# Patient Record
Sex: MALE | Race: WHITE | Employment: FULL TIME | URBAN - METROPOLITAN AREA
[De-identification: names, ages, dates, MRNs, and addresses within clinical notes are randomized per-mention and may not be internally consistent; named-entity substitution may affect disease eponyms.]

---

## 2018-01-16 ENCOUNTER — OFFICE VISIT (OUTPATIENT)
Dept: FAMILY MEDICINE CLINIC | Facility: CLINIC | Age: 33
End: 2018-01-16

## 2018-01-16 VITALS
DIASTOLIC BLOOD PRESSURE: 78 MMHG | HEIGHT: 71 IN | BODY MASS INDEX: 22.54 KG/M2 | TEMPERATURE: 97 F | RESPIRATION RATE: 16 BRPM | SYSTOLIC BLOOD PRESSURE: 128 MMHG | HEART RATE: 76 BPM | WEIGHT: 161 LBS

## 2018-01-16 DIAGNOSIS — A63.0 ANOGENITAL WART: Primary | ICD-10-CM

## 2018-01-16 PROCEDURE — 17110 DESTRUCTION B9 LES UP TO 14: CPT | Performed by: FAMILY MEDICINE

## 2018-01-16 RX ORDER — TRETINOIN 0.025 %
1 CREAM (GRAM) TOPICAL AS NEEDED
COMMUNITY
End: 2020-07-17 | Stop reason: ALTCHOICE

## 2018-01-16 RX ORDER — CLINDAMYCIN PHOSPHATE 10 MG/G
1 GEL TOPICAL DAILY
COMMUNITY
End: 2018-11-30

## 2018-01-16 NOTE — PROGRESS NOTES
[facility] [place]  PROGRESS NOTE  Chief Complaint:   Patient presents with:  Derm Problem      HPI:   This is a 29year old male coming in for treatment of penile warts. He has had these warts for many years.   He has had them treated several d Denies headache, seizures, dizziness, syncope, paralysis, ataxia, numbness or tingling in the extremities,change in bowel or bladder control. HEMATOLOGIC:  Denies anemia, bleeding or bruising. LYMPHATICS:  Denies enlarged nodes or history of splenectomy. is notified to call with any questions, complications, allergies, or worsening or changing symptoms. Patient is to call with any side effects or complications from the treatments as a result of today.      Problem List:  Patient Active Problem List:     An

## 2018-11-30 ENCOUNTER — OFFICE VISIT (OUTPATIENT)
Dept: FAMILY MEDICINE CLINIC | Facility: CLINIC | Age: 33
End: 2018-11-30
Payer: COMMERCIAL

## 2018-11-30 VITALS
WEIGHT: 163 LBS | HEIGHT: 71 IN | RESPIRATION RATE: 16 BRPM | DIASTOLIC BLOOD PRESSURE: 82 MMHG | HEART RATE: 60 BPM | BODY MASS INDEX: 22.82 KG/M2 | SYSTOLIC BLOOD PRESSURE: 114 MMHG | TEMPERATURE: 97 F

## 2018-11-30 DIAGNOSIS — B02.9 HERPES ZOSTER WITHOUT COMPLICATION: ICD-10-CM

## 2018-11-30 DIAGNOSIS — A63.0 ANOGENITAL WART: ICD-10-CM

## 2018-11-30 DIAGNOSIS — Z00.00 HEALTH CARE MAINTENANCE: Primary | ICD-10-CM

## 2018-11-30 PROCEDURE — 99395 PREV VISIT EST AGE 18-39: CPT | Performed by: FAMILY MEDICINE

## 2018-11-30 RX ORDER — ELECTROLYTES/DEXTROSE
1 SOLUTION, ORAL ORAL DAILY
COMMUNITY

## 2018-11-30 NOTE — PROGRESS NOTES
[facility] [place]  PROGRESS NOTE  Chief Complaint:   Patient presents with:  Physical      HPI:   This is a 29year old male coming in for his annual wellness visit. For over a year and a half now he has been vegan.   He is very comfortable w fatigue. EENT:  Eyes:  Denies eye pain, visual loss, blurred vision, double vision or yellow sclerae. Ears, Nose, Throat:  Denies hearing loss, sneezing, congestion, runny nose or sore throat.   INTEGUMENTARY:  Denies rashes, itching, skin lesion, or exces dentition. NECK: Supple, no CLAD, no JVD, no thyromegaly. SKIN: No rashes, no skin lesion, no bruising, good turgor. HEART: Slow steady strong and regular beat. Regular S1-S2.  LUNGS: Clear to auscultation bilterally, no rales/rhonchi/wheezing.   ABDOME PANEL  - ASSAY, THYROID STIM HORMONE  - B12 AND FOLATE  - IRON AND TIBC      Meds & Refills for this Visit:  Requested Prescriptions      No prescriptions requested or ordered in this encounter     Patient/Caregiver Education: Patient/Caregiver Education:

## 2018-12-03 ENCOUNTER — TELEPHONE (OUTPATIENT)
Dept: FAMILY MEDICINE CLINIC | Facility: CLINIC | Age: 33
End: 2018-12-03

## 2018-12-03 NOTE — TELEPHONE ENCOUNTER
----- Message from Kristian Kearney MD sent at 12/3/2018  2:08 PM CST -----  Please call Tamera Rodriguez. The news from his labs is very good. His hemoglobin is normal at 14.9. He is not anemic. His blood sugar is normal at 82. He does not have diabetes.   Hi

## 2019-01-07 ENCOUNTER — TELEPHONE (OUTPATIENT)
Dept: FAMILY MEDICINE CLINIC | Facility: CLINIC | Age: 34
End: 2019-01-07

## 2019-01-07 NOTE — TELEPHONE ENCOUNTER
Patient requesting Rx for genital warts that was discussed with Dr Meaghan Campbell to be sent to  [facility]. States the cryo has not kept the warts away. Please advise.   Brisa Garnica, 01/07/19, 1:46 PM

## 2019-01-09 RX ORDER — IMIQUIMOD 12.5 MG/.25G
CREAM TOPICAL
Qty: 24 PACKET | Refills: 1 | Status: SHIPPED | OUTPATIENT
Start: 2019-01-09 | End: 2020-03-09 | Stop reason: ALTCHOICE

## 2019-01-09 NOTE — TELEPHONE ENCOUNTER
I will send in a prescription for Aldara for him. He should apply this to the warts at bedtime three times a week for 8 weeks.

## 2019-01-29 ENCOUNTER — OFFICE VISIT (OUTPATIENT)
Dept: FAMILY MEDICINE CLINIC | Facility: CLINIC | Age: 34
End: 2019-01-29
Payer: COMMERCIAL

## 2019-01-29 VITALS
WEIGHT: 167.25 LBS | RESPIRATION RATE: 16 BRPM | SYSTOLIC BLOOD PRESSURE: 110 MMHG | BODY MASS INDEX: 23.41 KG/M2 | DIASTOLIC BLOOD PRESSURE: 62 MMHG | HEIGHT: 71 IN | TEMPERATURE: 97 F | HEART RATE: 58 BPM

## 2019-01-29 DIAGNOSIS — L20.84 INTRINSIC ECZEMA: Primary | ICD-10-CM

## 2019-01-29 DIAGNOSIS — A63.0 ANOGENITAL WART: ICD-10-CM

## 2019-01-29 PROCEDURE — 99214 OFFICE O/P EST MOD 30 MIN: CPT | Performed by: FAMILY MEDICINE

## 2019-01-29 RX ORDER — ALBUTEROL SULFATE 90 UG/1
2 AEROSOL, METERED RESPIRATORY (INHALATION) EVERY 4 HOURS PRN
COMMUNITY
Start: 2018-07-05 | End: 2019-07-05

## 2019-01-29 NOTE — PROGRESS NOTES
[facility] [place]  PROGRESS NOTE  Chief Complaint:   Patient presents with:  Urgent Care F/u      HPI:   This is a 34year old male coming in for 2 main concerns today.     First, he has had patches of scaly itchy skin on his arms, trunk, and ba 3.5 - 5.3 mmol/L    CHLORIDE 102 98 - 110 mmol/L    CARBON DIOXIDE 27 20 - 32 mmol/L    CALCIUM 9.9 8.6 - 10.3 mg/dL    PROTEIN, TOTAL 7.5 6.1 - 8.1 g/dL    ALBUMIN 4.9 3.6 - 5.1 g/dL    GLOBULIN 2.6 1.9 - 3.7 g/dL (calc)    ALBUMIN/GLOBULIN RATIO 1.9 1.0 Rfl: 1   tretinoin 0.025 % External Cream Apply 1 Application topically as needed (x 3 weekly). Disp:  Rfl:       Counseling given: Not Answered       REVIEW OF SYSTEMS:   CONSTITUTIONAL:  Denies unusual weight gain/loss, fever, chills, or fatigue.   EENT: treated with nonmedication means. We discussed soap and lotion and multiple other types of treatment. 2. Anogenital wart  He has recurrence of his genital warts. Plan: Cryotherapy today. If they recur again we can do cryo again.       Meds & Refills f

## 2019-10-14 ENCOUNTER — OFFICE VISIT (OUTPATIENT)
Dept: FAMILY MEDICINE CLINIC | Facility: CLINIC | Age: 34
End: 2019-10-14
Payer: COMMERCIAL

## 2019-10-14 VITALS
RESPIRATION RATE: 16 BRPM | HEIGHT: 71 IN | WEIGHT: 161.38 LBS | HEART RATE: 60 BPM | OXYGEN SATURATION: 99 % | BODY MASS INDEX: 22.59 KG/M2 | SYSTOLIC BLOOD PRESSURE: 116 MMHG | DIASTOLIC BLOOD PRESSURE: 78 MMHG | TEMPERATURE: 98 F

## 2019-10-14 DIAGNOSIS — Z23 NEED FOR VACCINATION: ICD-10-CM

## 2019-10-14 DIAGNOSIS — B35.1 TINEA UNGUIUM: Primary | ICD-10-CM

## 2019-10-14 DIAGNOSIS — A63.0 ANOGENITAL WART: ICD-10-CM

## 2019-10-14 PROCEDURE — 90686 IIV4 VACC NO PRSV 0.5 ML IM: CPT | Performed by: FAMILY MEDICINE

## 2019-10-14 PROCEDURE — 90472 IMMUNIZATION ADMIN EACH ADD: CPT | Performed by: FAMILY MEDICINE

## 2019-10-14 PROCEDURE — 99213 OFFICE O/P EST LOW 20 MIN: CPT | Performed by: FAMILY MEDICINE

## 2019-10-14 PROCEDURE — 90715 TDAP VACCINE 7 YRS/> IM: CPT | Performed by: FAMILY MEDICINE

## 2019-10-14 PROCEDURE — 90471 IMMUNIZATION ADMIN: CPT | Performed by: FAMILY MEDICINE

## 2019-10-14 NOTE — PROGRESS NOTES
[address]  PROGRESS NOTE  Chief Complaint:   Patient presents with:   Well Adult: Pt is here for toenail fungus on right foot and also to have flu and tdap vaccines      HPI:   This is a 34year old male coming in for multiple concerns t 4.1 3.5 - 5.3 mmol/L    CHLORIDE 102 98 - 110 mmol/L    CARBON DIOXIDE 27 20 - 32 mmol/L    CALCIUM 9.9 8.6 - 10.3 mg/dL    PROTEIN, TOTAL 7.5 6.1 - 8.1 g/dL    ALBUMIN 4.9 3.6 - 5.1 g/dL    GLOBULIN 2.6 1.9 - 3.7 g/dL (calc)    ALBUMIN/GLOBULIN RATIO 1.9 REVIEW OF SYSTEMS:   CONSTITUTIONAL:  Denies unusual weight gain/loss, fever, chills, or fatigue. EENT:  Eyes:  Denies eye pain, visual loss, blurred vision, double vision or yellow sclerae.  Ears, Nose, Throat:  Denies hearing loss, sneezing, congesti IMMUNIZATION ADMINISTRATION  - EACH ADDITIONAL VACCINE  - FLULAVAL INFLUENZA VACCINE QUAD PRESERVATIVE FREE 0.5 ML      Meds & Refills for this Visit:  Requested Prescriptions      No prescriptions requested or ordered in this encounter       Gin Goodman

## 2020-03-09 ENCOUNTER — OFFICE VISIT (OUTPATIENT)
Dept: FAMILY MEDICINE CLINIC | Facility: CLINIC | Age: 35
End: 2020-03-09
Payer: COMMERCIAL

## 2020-03-09 VITALS
DIASTOLIC BLOOD PRESSURE: 70 MMHG | HEART RATE: 86 BPM | TEMPERATURE: 98 F | WEIGHT: 163.19 LBS | OXYGEN SATURATION: 98 % | HEIGHT: 71 IN | BODY MASS INDEX: 22.85 KG/M2 | SYSTOLIC BLOOD PRESSURE: 120 MMHG | RESPIRATION RATE: 16 BRPM

## 2020-03-09 DIAGNOSIS — A63.0 ANOGENITAL WART: ICD-10-CM

## 2020-03-09 DIAGNOSIS — Z00.00 HEALTH CARE MAINTENANCE: Primary | ICD-10-CM

## 2020-03-09 DIAGNOSIS — J30.1 SEASONAL ALLERGIC RHINITIS DUE TO POLLEN: ICD-10-CM

## 2020-03-09 DIAGNOSIS — S86.899A ANTERIOR SHIN SPLINTS: ICD-10-CM

## 2020-03-09 DIAGNOSIS — Z86.19 HISTORY OF SHINGLES: ICD-10-CM

## 2020-03-09 PROBLEM — B02.9 HERPES ZOSTER WITHOUT COMPLICATION: Status: RESOLVED | Noted: 2018-11-30 | Resolved: 2020-03-09

## 2020-03-09 PROCEDURE — 99395 PREV VISIT EST AGE 18-39: CPT | Performed by: FAMILY MEDICINE

## 2020-03-09 PROCEDURE — 99213 OFFICE O/P EST LOW 20 MIN: CPT | Performed by: FAMILY MEDICINE

## 2020-03-09 NOTE — PROGRESS NOTES
[place] [facility] [place]  PROGRESS NOTE  Chief Complaint:   Patient presents with:  Physical: general      HPI:   This is a 30year old male coming in for his annual wellness visit. He has been feeling good overall.   He has several concerns today: 50 15 - 500 cells/uL    ABSOLUTE BASOPHILS 28 0 - 200 cells/uL    NEUTROPHILS 67.2 %    LYMPHOCYTES 25.3 %    MONOCYTES 6.1 %    EOSINOPHILS 0.9 %    BASOPHILS 0.5 %   COMP METABOLIC PANEL (14)   Result Value Ref Range    GLUCOSE 82 65 - 99 mg/dL    UREA N History:  Family History   Problem Relation Age of Onset   • Hypertension Father    • Hypertension Mother    • Lipids Mother      Allergies:  No Known Allergies  Current Meds:  Current Outpatient Medications   Medication Sig Dispense Refill   • Multiple Vi 71\".    Weight as of this encounter: 163 lb 3.2 oz (74 kg). Vital signs reviewed. Physical Exam:  GEN:  Patient is alert, awake and oriented, well developed, well nourished, no apparent distress.   HEENT:  Head:  Normocephalic, atraumatic Eyes: EOMI, PE discussed some things that he can do to try to treat that. 5. Anogenital wart  He has multiple warts on the scrotum and penis. They were frozen with nitrous gas. No additional treatment recommended now.       Meds & Refills for this Visit:  Requested P

## 2020-07-17 ENCOUNTER — OFFICE VISIT (OUTPATIENT)
Dept: FAMILY MEDICINE CLINIC | Facility: CLINIC | Age: 35
End: 2020-07-17
Payer: COMMERCIAL

## 2020-07-17 ENCOUNTER — HOSPITAL ENCOUNTER (OUTPATIENT)
Dept: GENERAL RADIOLOGY | Age: 35
Discharge: HOME | End: 2020-07-17
Attending: FAMILY MEDICINE
Payer: COMMERCIAL

## 2020-07-17 VITALS
HEIGHT: 71 IN | TEMPERATURE: 98 F | SYSTOLIC BLOOD PRESSURE: 112 MMHG | HEART RATE: 91 BPM | DIASTOLIC BLOOD PRESSURE: 64 MMHG | RESPIRATION RATE: 18 BRPM | OXYGEN SATURATION: 98 % | WEIGHT: 162 LBS | BODY MASS INDEX: 22.68 KG/M2

## 2020-07-17 DIAGNOSIS — M25.561 RIGHT KNEE PAIN, UNSPECIFIED CHRONICITY: Primary | ICD-10-CM

## 2020-07-17 DIAGNOSIS — M25.561 RIGHT KNEE PAIN, UNSPECIFIED CHRONICITY: ICD-10-CM

## 2020-07-17 PROCEDURE — 73560 X-RAY EXAM OF KNEE 1 OR 2: CPT | Performed by: FAMILY MEDICINE

## 2020-07-17 PROCEDURE — 99213 OFFICE O/P EST LOW 20 MIN: CPT | Performed by: FAMILY MEDICINE

## 2020-07-17 PROCEDURE — 3074F SYST BP LT 130 MM HG: CPT | Performed by: FAMILY MEDICINE

## 2020-07-17 PROCEDURE — 3008F BODY MASS INDEX DOCD: CPT | Performed by: FAMILY MEDICINE

## 2020-07-17 PROCEDURE — 3078F DIAST BP <80 MM HG: CPT | Performed by: FAMILY MEDICINE

## 2020-07-17 RX ORDER — NAPROXEN 500 MG/1
500 TABLET ORAL 2 TIMES DAILY PRN
Qty: 30 TABLET | Refills: 0 | Status: SHIPPED | OUTPATIENT
Start: 2020-07-17 | End: 2021-09-11 | Stop reason: ALTCHOICE

## 2020-07-17 NOTE — PATIENT INSTRUCTIONS
Check xray today. Rest, ice and naproxen as needed   Consider physical therapy. See Dr Grecia Hobbs if no improvement.

## 2020-07-17 NOTE — PROGRESS NOTES
[address]  PROGRESS NOTE  Chief Complaint:   Patient presents with:  Knee Pain: right leg discomfort x March, , Xray/ MRI to see what is wrong, pain is manageable, when running, has to stop, when not active pain goes away rashes, itching, skin lesion, or excessive skin dryness. CARDIOVASCULAR:  Denies chest pain, chest pressure, chest discomfort, palpitations, edema, dyspnea on exertion or at rest.  RESPIRATORY:  Denies shortness of breath, wheezing, cough or sputum.   [facility] xray today. Rest, ice and naproxen as needed   Consider physical therapy. See Dr Perry Andrade if no improvement.          Health Maintenance:  Annual Depression Screen due on 10/14/2020    Patient/Caregiver Education: Patient/Caregiver Education: There are no

## 2020-07-18 ENCOUNTER — TELEPHONE (OUTPATIENT)
Dept: FAMILY MEDICINE CLINIC | Facility: CLINIC | Age: 35
End: 2020-07-18

## 2020-07-18 NOTE — TELEPHONE ENCOUNTER
----- Message from Jarek Yanes MD sent at 7/17/2020  6:01 PM CDT -----  Please inform patient that radiologist report is showed trace amount of joint effusion, otherwise there is no other abnormality noted.   Due to his current symptoms I recommend that h

## 2020-07-20 NOTE — TELEPHONE ENCOUNTER
Let pt know the following below. Pt verbalized his understanding and had no other questions at this time. Sent jeff in xray a staff message to make copy of xray done on 7/17/20.

## 2020-11-09 ENCOUNTER — TELEPHONE (OUTPATIENT)
Dept: FAMILY MEDICINE CLINIC | Facility: CLINIC | Age: 35
End: 2020-11-09

## 2020-11-09 NOTE — TELEPHONE ENCOUNTER
[facility] sent a request for medical records for the last 5 years. This was sent to [employer].

## 2021-09-10 ENCOUNTER — TELEPHONE (OUTPATIENT)
Dept: FAMILY MEDICINE CLINIC | Facility: CLINIC | Age: 36
End: 2021-09-10

## 2021-09-10 NOTE — TELEPHONE ENCOUNTER
Has appt tomorrow. Answered yes to rash on travel screening. Pt states he was fishing last weekend and believes he has poison ivy from being in the woods. Rash is on chest and legs and is spreading. Denies any other covid symptoms.

## 2021-09-11 ENCOUNTER — OFFICE VISIT (OUTPATIENT)
Dept: FAMILY MEDICINE CLINIC | Facility: CLINIC | Age: 36
End: 2021-09-11
Payer: COMMERCIAL

## 2021-09-11 VITALS
TEMPERATURE: 98 F | HEART RATE: 68 BPM | DIASTOLIC BLOOD PRESSURE: 78 MMHG | RESPIRATION RATE: 16 BRPM | HEIGHT: 72 IN | SYSTOLIC BLOOD PRESSURE: 104 MMHG | BODY MASS INDEX: 21.86 KG/M2 | WEIGHT: 161.38 LBS

## 2021-09-11 DIAGNOSIS — L24.7 IRRITANT CONTACT DERMATITIS DUE TO PLANTS, EXCEPT FOOD: Primary | ICD-10-CM

## 2021-09-11 PROCEDURE — 3078F DIAST BP <80 MM HG: CPT | Performed by: FAMILY MEDICINE

## 2021-09-11 PROCEDURE — 3074F SYST BP LT 130 MM HG: CPT | Performed by: FAMILY MEDICINE

## 2021-09-11 PROCEDURE — 99213 OFFICE O/P EST LOW 20 MIN: CPT | Performed by: FAMILY MEDICINE

## 2021-09-11 PROCEDURE — 3008F BODY MASS INDEX DOCD: CPT | Performed by: FAMILY MEDICINE

## 2021-09-11 RX ORDER — PREDNISONE 10 MG/1
TABLET ORAL
Qty: 30 TABLET | Refills: 0 | Status: SHIPPED | OUTPATIENT
Start: 2021-09-11

## 2021-09-11 NOTE — PROGRESS NOTES
[address]  PROGRESS NOTE  Chief Complaint:   Patient presents with:  Rash: Poison Ivy? HPI:   This is a 37year old male coming in for a rash on his skin. He reports that he was out camping last weekend.   He went into the bushes t GLOBULIN 2.6 1.9 - 3.7 g/dL (calc)    ALBUMIN/GLOBULIN RATIO 1.9 1.0 - 2.5 (calc)    BILIRUBIN, TOTAL 1.1 0.2 - 1.2 mg/dL    ALKALINE PHOSPHATASE 65 40 - 115 U/L    AST 25 10 - 40 U/L    ALT 23 9 - 46 U/L   LIPID PANEL   Result Value Ref Range    CHOLESTER yellow sclerae. Ears, Nose, Throat:  Denies hearing loss, sneezing, congestion, runny nose or sore throat.   INTEGUMENTARY: See HPI  CARDIOVASCULAR:  Denies chest pain, chest pressure, chest discomfort, palpitations, edema, dyspnea on exertion or at rest. Blistering rash on the front part of the chest in a relatively square pattern. There is a linear pattern of a blistering rash on the left thigh. He has scattered blisters on his right wrist and other areas of his left arm.   HEART:  Regular rate and rhyth

## 2021-09-21 ENCOUNTER — TELEPHONE (OUTPATIENT)
Dept: FAMILY MEDICINE CLINIC | Facility: CLINIC | Age: 36
End: 2021-09-21

## 2021-09-21 NOTE — TELEPHONE ENCOUNTER
Finish the steroids tomorrow and expect the rash to slowly get better. If it does not slowly get better, give us a call and we can talk about another type of treatment. It is okay to take Benadryl at night for the itching.   Please continue to use calamin

## 2021-11-02 ENCOUNTER — TELEPHONE (OUTPATIENT)
Dept: FAMILY MEDICINE CLINIC | Facility: CLINIC | Age: 36
End: 2021-11-02

## 2021-11-02 DIAGNOSIS — K13.79 LUMP IN MOUTH: Primary | ICD-10-CM

## 2021-11-02 NOTE — TELEPHONE ENCOUNTER
wants to get a referrel for lump on his lip. needs to go to someone in his network with his insurance.

## 2021-11-03 NOTE — TELEPHONE ENCOUNTER
Patient my chart messaged back-  Needing Referral to [facility]-  Oral Surgery/[facility]. Please advise.

## 2021-11-03 NOTE — TELEPHONE ENCOUNTER
Please advise referral for lump in lip. It was discussed at last office visit.   Neel Pierce CMA, 11/03/21, 1:37 PM

## 2022-05-23 ENCOUNTER — OFFICE VISIT (OUTPATIENT)
Dept: FAMILY MEDICINE CLINIC | Facility: CLINIC | Age: 37
End: 2022-05-23
Payer: COMMERCIAL

## 2022-05-23 VITALS
BODY MASS INDEX: 21.67 KG/M2 | HEART RATE: 69 BPM | DIASTOLIC BLOOD PRESSURE: 60 MMHG | HEIGHT: 72 IN | RESPIRATION RATE: 16 BRPM | SYSTOLIC BLOOD PRESSURE: 102 MMHG | OXYGEN SATURATION: 99 % | TEMPERATURE: 99 F | WEIGHT: 160 LBS

## 2022-05-23 DIAGNOSIS — R05.9 COUGH: Primary | ICD-10-CM

## 2022-05-23 DIAGNOSIS — J30.1 SEASONAL ALLERGIC RHINITIS DUE TO POLLEN: ICD-10-CM

## 2022-05-23 DIAGNOSIS — Z82.5 FAMILY HISTORY OF ASTHMA: ICD-10-CM

## 2022-05-23 PROCEDURE — 3078F DIAST BP <80 MM HG: CPT | Performed by: NURSE PRACTITIONER

## 2022-05-23 PROCEDURE — 99214 OFFICE O/P EST MOD 30 MIN: CPT | Performed by: NURSE PRACTITIONER

## 2022-05-23 PROCEDURE — 3008F BODY MASS INDEX DOCD: CPT | Performed by: NURSE PRACTITIONER

## 2022-05-23 PROCEDURE — 3074F SYST BP LT 130 MM HG: CPT | Performed by: NURSE PRACTITIONER

## 2022-05-23 RX ORDER — CETIRIZINE HYDROCHLORIDE 10 MG/1
10 TABLET ORAL DAILY
COMMUNITY

## 2022-05-23 RX ORDER — ALBUTEROL SULFATE 90 UG/1
2 AEROSOL, METERED RESPIRATORY (INHALATION) EVERY 4 HOURS PRN
Qty: 1 EACH | Refills: 0 | Status: SHIPPED | OUTPATIENT
Start: 2022-05-23

## 2022-05-23 RX ORDER — FLUTICASONE PROPIONATE 50 MCG
1 SPRAY, SUSPENSION (ML) NASAL DAILY
COMMUNITY

## 2022-05-23 NOTE — PATIENT INSTRUCTIONS
Continue zyrtec  Start fluticasone (flonase) two sprays each nostril once a day for a week then one spray each nostril daily  Trial of albuterol hfa inhaler 2 puffs every 4 hours as needed; side effects reviewed  Continue with humidfier  Notify office if no significant improvement by the end of the week.